# Patient Record
Sex: MALE | Race: WHITE | NOT HISPANIC OR LATINO | URBAN - METROPOLITAN AREA
[De-identification: names, ages, dates, MRNs, and addresses within clinical notes are randomized per-mention and may not be internally consistent; named-entity substitution may affect disease eponyms.]

---

## 2020-10-28 ENCOUNTER — OFFICE VISIT (OUTPATIENT)
Dept: URBAN - METROPOLITAN AREA TELEHEALTH 2 | Facility: TELEHEALTH | Age: 55
End: 2020-10-28
Payer: COMMERCIAL

## 2020-10-28 DIAGNOSIS — R19.7 DIARRHEA: ICD-10-CM

## 2020-10-28 DIAGNOSIS — K51.80 CHRONIC PANCOLONIC ULCERATIVE COLITIS: ICD-10-CM

## 2020-10-28 DIAGNOSIS — K52.89 (LYMPHOCYTIC) MICROSCOPIC COLITIS: ICD-10-CM

## 2020-10-28 DIAGNOSIS — K51.90 ULCERATIVE COLITIS: ICD-10-CM

## 2020-10-28 DIAGNOSIS — K52.9 IBD (INFLAMMATORY BOWEL DISEASE): ICD-10-CM

## 2020-10-28 PROCEDURE — G8427 DOCREV CUR MEDS BY ELIG CLIN: HCPCS | Performed by: INTERNAL MEDICINE

## 2020-10-28 PROCEDURE — 1036F TOBACCO NON-USER: CPT | Performed by: INTERNAL MEDICINE

## 2020-10-28 PROCEDURE — G9903 PT SCRN TBCO ID AS NON USER: HCPCS | Performed by: INTERNAL MEDICINE

## 2020-10-28 PROCEDURE — 3017F COLORECTAL CA SCREEN DOC REV: CPT | Performed by: INTERNAL MEDICINE

## 2020-10-28 PROCEDURE — 99213 OFFICE O/P EST LOW 20 MIN: CPT | Performed by: INTERNAL MEDICINE

## 2020-10-28 PROCEDURE — G8484 FLU IMMUNIZE NO ADMIN: HCPCS | Performed by: INTERNAL MEDICINE

## 2020-10-28 PROCEDURE — G8417 CALC BMI ABV UP PARAM F/U: HCPCS | Performed by: INTERNAL MEDICINE

## 2020-10-28 RX ORDER — PREDNISONE 10 MG/1
1 TABLET TABLET ORAL ONCE A DAY
Qty: 30 | OUTPATIENT
Start: 2020-10-28 | End: 2020-11-26

## 2020-10-28 RX ORDER — MESALAMINE 4 G/60ML
AS DIRECTED SUSPENSION RECTAL
Qty: 30 | Refills: 0 | OUTPATIENT
Start: 2020-10-28 | End: 2020-11-26

## 2020-10-28 RX ORDER — MESALAMINE 1.2 G/1
TAKE 2 TABLETS (2.4 GRAM) BY ORAL ROUTE ONCE DAILY WITH A MEAL TABLET, DELAYED RELEASE ORAL 1
Qty: 180 | Refills: 3 | Status: ACTIVE | COMMUNITY
Start: 2018-05-11 | End: 1900-01-01

## 2020-10-28 RX ORDER — PREDNISONE 10 MG/1
6 PO QD TABLET ORAL
Qty: 180 | Refills: 3 | Status: ACTIVE | COMMUNITY
Start: 2018-03-09 | End: 1900-01-01

## 2020-10-28 NOTE — HPI-TODAY'S VISIT:
the patient is evaluated today by telephone.  I have not seen him in over 2 years.  We last visited in May of 2018.  He has chronic left-sided ulcerative colitis.  We did a prednisone taper at his last visit.  I prescribed mesalamine 2.4 g daily to take for maintenance.  He did not do that.  He has not been on any medication at all after he took mesalamine for about a month 2 and half years ago.  He started to get a flare a few weeks ago with abdominal pain cramping and bloody diarrhea.

## 2020-11-09 ENCOUNTER — TELEPHONE ENCOUNTER (OUTPATIENT)
Dept: URBAN - METROPOLITAN AREA CLINIC 92 | Facility: CLINIC | Age: 55
End: 2020-11-09

## 2020-12-03 ENCOUNTER — OFFICE VISIT (OUTPATIENT)
Dept: URBAN - METROPOLITAN AREA TELEHEALTH 2 | Facility: TELEHEALTH | Age: 55
End: 2020-12-03
Payer: COMMERCIAL

## 2020-12-03 DIAGNOSIS — K61.0 PERIANAL ABSCESS: ICD-10-CM

## 2020-12-03 DIAGNOSIS — K52.89 (LYMPHOCYTIC) MICROSCOPIC COLITIS: ICD-10-CM

## 2020-12-03 DIAGNOSIS — K52.9 IBD (INFLAMMATORY BOWEL DISEASE): ICD-10-CM

## 2020-12-03 DIAGNOSIS — K51.90 ULCERATIVE COLITIS: ICD-10-CM

## 2020-12-03 PROBLEM — 64766004 ULCERATIVE COLITIS: Status: ACTIVE | Noted: 2020-10-28

## 2020-12-03 PROCEDURE — G8417 CALC BMI ABV UP PARAM F/U: HCPCS | Performed by: INTERNAL MEDICINE

## 2020-12-03 PROCEDURE — 1036F TOBACCO NON-USER: CPT | Performed by: INTERNAL MEDICINE

## 2020-12-03 PROCEDURE — G8427 DOCREV CUR MEDS BY ELIG CLIN: HCPCS | Performed by: INTERNAL MEDICINE

## 2020-12-03 PROCEDURE — 99213 OFFICE O/P EST LOW 20 MIN: CPT | Performed by: INTERNAL MEDICINE

## 2020-12-03 PROCEDURE — G9903 PT SCRN TBCO ID AS NON USER: HCPCS | Performed by: INTERNAL MEDICINE

## 2020-12-03 PROCEDURE — G8482 FLU IMMUNIZE ORDER/ADMIN: HCPCS | Performed by: INTERNAL MEDICINE

## 2020-12-03 PROCEDURE — 3017F COLORECTAL CA SCREEN DOC REV: CPT | Performed by: INTERNAL MEDICINE

## 2020-12-03 RX ORDER — SODIUM, POTASSIUM,MAG SULFATES 17.5-3.13G
177 ML SOLUTION, RECONSTITUTED, ORAL ORAL
Qty: 1 KIT | Refills: 0 | OUTPATIENT
Start: 2020-12-03

## 2020-12-03 RX ORDER — BISACODYL 5 MG
TAKE 4 TABLET, DELAYED RELEASE (ENTERIC COATED) ORAL
Qty: 4 | OUTPATIENT
Start: 2020-12-03 | End: 2020-12-04

## 2020-12-03 RX ORDER — PREDNISONE 10 MG/1
6 PO QD TABLET ORAL
Qty: 180 | Refills: 3 | Status: ACTIVE | COMMUNITY
Start: 2018-03-09

## 2020-12-03 RX ORDER — MESALAMINE 1.2 G/1
TAKE 2 TABLETS (2.4 GRAM) BY ORAL ROUTE ONCE DAILY WITH A MEAL TABLET, DELAYED RELEASE ORAL 1
Qty: 180 | Refills: 3 | Status: ACTIVE | COMMUNITY
Start: 2018-05-11

## 2020-12-03 NOTE — HPI-TODAY'S VISIT:
the patient is evaluated today by telemedicine.  He has chronic ulcerative colitis since the 90s.  I did his colonoscopy in April of 2018. He had ulcerative colitis from the mid transverse colon down to the rectum.  He has not been on anything consistently daily to keep his colitis under control.  At his last visit he was seen because he was having a flare.  He was started on prednisone taper and things got better however he developed a perianal abscess from history, I have no records, and had this drained in the OR at   [facility]. bowels are doing better now.  Not having diarrhea or bloody stool.  He was is recently tested positive for COVID and is now on  quarantine

## 2021-01-28 ENCOUNTER — OFFICE VISIT (OUTPATIENT)
Dept: RURAL MEDICAL CENTER 4 | Facility: MEDICAL CENTER | Age: 56
End: 2021-01-28
Payer: COMMERCIAL

## 2021-01-28 DIAGNOSIS — D12.2 ADENOMA OF ASCENDING COLON: ICD-10-CM

## 2021-01-28 DIAGNOSIS — K63.89 BACTERIAL OVERGROWTH SYNDROME: ICD-10-CM

## 2021-01-28 DIAGNOSIS — K51.80 CHRONIC PANCOLONIC ULCERATIVE COLITIS: ICD-10-CM

## 2021-01-28 PROCEDURE — 45380 COLONOSCOPY AND BIOPSY: CPT | Performed by: INTERNAL MEDICINE

## 2021-01-28 RX ORDER — MESALAMINE 1.2 G/1
TAKE 2 TABLETS (2.4 GRAM) BY ORAL ROUTE ONCE DAILY WITH A MEAL TABLET, DELAYED RELEASE ORAL 1
Qty: 180 | Refills: 3 | Status: ACTIVE | COMMUNITY
Start: 2018-05-11

## 2021-01-28 RX ORDER — PREDNISONE 10 MG/1
6 PO QD TABLET ORAL
Qty: 180 | Refills: 3 | Status: ACTIVE | COMMUNITY
Start: 2018-03-09

## 2021-01-28 RX ORDER — SODIUM, POTASSIUM,MAG SULFATES 17.5-3.13G
177 ML SOLUTION, RECONSTITUTED, ORAL ORAL
Qty: 1 KIT | Refills: 0 | Status: ACTIVE | COMMUNITY
Start: 2020-12-03

## 2021-02-02 ENCOUNTER — TELEPHONE ENCOUNTER (OUTPATIENT)
Dept: URBAN - METROPOLITAN AREA CLINIC 105 | Facility: CLINIC | Age: 56
End: 2021-02-02

## 2021-02-02 RX ORDER — MESALAMINE 1.2 G/1
2 TABLETS TABLET, DELAYED RELEASE ORAL ONCE A DAY
Qty: 60 TABLET | Refills: 5 | OUTPATIENT
Start: 2021-02-02 | End: 2021-08-01

## 2021-08-11 ENCOUNTER — OFFICE VISIT (OUTPATIENT)
Dept: RURAL CLINIC 8 | Facility: CLINIC | Age: 56
End: 2021-08-11
Payer: COMMERCIAL

## 2021-08-11 DIAGNOSIS — K51.30 CHRONIC ULCERATIVE RECTOSIGMOIDITIS WITHOUT COMPLICATIONS: ICD-10-CM

## 2021-08-11 DIAGNOSIS — Z86.010 HISTORY OF COLON POLYPS: ICD-10-CM

## 2021-08-11 DIAGNOSIS — K52.9 IBD (INFLAMMATORY BOWEL DISEASE): ICD-10-CM

## 2021-08-11 PROBLEM — 52506002: Status: ACTIVE | Noted: 2021-08-11

## 2021-08-11 PROCEDURE — 99214 OFFICE O/P EST MOD 30 MIN: CPT | Performed by: INTERNAL MEDICINE

## 2021-08-11 RX ORDER — PREDNISONE 10 MG/1
6 PO QD TABLET ORAL
Qty: 180 | Refills: 3 | Status: DISCONTINUED | COMMUNITY
Start: 2018-03-09

## 2021-08-11 RX ORDER — SODIUM, POTASSIUM,MAG SULFATES 17.5-3.13G
177 ML SOLUTION, RECONSTITUTED, ORAL ORAL
Qty: 1 KIT | Refills: 0 | Status: DISCONTINUED | COMMUNITY
Start: 2020-12-03

## 2021-08-11 RX ORDER — MESALAMINE 1.2 G/1
TAKE 2 TABLETS (2.4 GRAM) BY ORAL ROUTE ONCE DAILY WITH A MEAL TABLET, DELAYED RELEASE ORAL 1
Qty: 180 | Refills: 3
Start: 2018-05-11

## 2021-08-11 RX ORDER — MESALAMINE 1.2 G/1
TAKE 2 TABLETS (2.4 GRAM) BY ORAL ROUTE ONCE DAILY WITH A MEAL TABLET, DELAYED RELEASE ORAL 1
Qty: 180 | Refills: 3 | Status: ACTIVE | COMMUNITY
Start: 2018-05-11

## 2021-08-11 NOTE — HPI-TODAY'S VISIT:
I did this patient's colonoscopy in January 2021.  There was an area of continuous inflammation in the left colon consistent with chronic ulcerative colitis.  The patient also did have areas of patchy inflammation in the other areas of the colon.  Biopsies were consistent with inflammatory bowel disease.  There was 1 small tubular adenoma that I removed. - he is doing very well on mesalamine. he is taking 2.4gm daily and a probiotic. he is having normal stool without diarrhea. no blood or mucous. he is having two formed stools per day. no issues. - he was first diagnosed in 1996. A GI doctor in [facility] found it first.

## 2021-10-27 ENCOUNTER — ERX REFILL RESPONSE (OUTPATIENT)
Dept: RURAL CLINIC 2 | Facility: CLINIC | Age: 56
End: 2021-10-27

## 2021-10-27 RX ORDER — MESALAMINE 1.2 G/1
TAKE TWO TABLETS BY MOUTH EVERY DAY TABLET, DELAYED RELEASE ORAL
Qty: 60 TABLET | Refills: 6 | OUTPATIENT

## 2022-07-13 ENCOUNTER — OFFICE VISIT (OUTPATIENT)
Dept: RURAL CLINIC 8 | Facility: CLINIC | Age: 57
End: 2022-07-13
Payer: COMMERCIAL

## 2022-07-13 VITALS
WEIGHT: 255 LBS | SYSTOLIC BLOOD PRESSURE: 149 MMHG | TEMPERATURE: 98.2 F | HEIGHT: 72 IN | BODY MASS INDEX: 34.54 KG/M2 | DIASTOLIC BLOOD PRESSURE: 97 MMHG | HEART RATE: 68 BPM

## 2022-07-13 DIAGNOSIS — K52.9 IBD (INFLAMMATORY BOWEL DISEASE): ICD-10-CM

## 2022-07-13 DIAGNOSIS — Z86.010 HISTORY OF COLON POLYPS: ICD-10-CM

## 2022-07-13 DIAGNOSIS — K51.30 ULCERATIVE RECTOSIGMOIDITIS WITHOUT COMPLICATION: ICD-10-CM

## 2022-07-13 PROBLEM — 428283002: Status: ACTIVE | Noted: 2021-08-11

## 2022-07-13 PROBLEM — 41364008: Status: ACTIVE | Noted: 2022-07-13

## 2022-07-13 PROCEDURE — 99214 OFFICE O/P EST MOD 30 MIN: CPT | Performed by: INTERNAL MEDICINE

## 2022-07-13 RX ORDER — MESALAMINE 1.2 G/1
TAKE TWO TABLETS BY MOUTH EVERY DAY TABLET, DELAYED RELEASE ORAL
Qty: 60 TABLET | Refills: 6 | Status: ACTIVE | COMMUNITY

## 2022-07-13 RX ORDER — MESALAMINE 1.2 G/1
2 TABLETS TABLET, DELAYED RELEASE ORAL ONCE A DAY
Qty: 180 TABLET | Refills: 3

## 2022-07-13 NOTE — HPI-TODAY'S VISIT:
I did this patient's colonoscopy in January 2021.  There was an area of continuous inflammation in the left colon consistent with chronic ulcerative colitis.  The patient also did have areas of patchy inflammation in the other areas of the colon.  Biopsies were consistent with inflammatory bowel disease.  There was 1 small tubular adenoma that I removed. - he is doing very well on mesalamine. he is taking 2.4gm daily and a probiotic. he is having normal stool without diarrhea. no blood or mucous. he is having two formed stools per day. no issues. - he was first diagnosed in 1996. A GI doctor in [facility] found it first.

## 2023-06-17 LAB
A/G RATIO: 1.8
ALBUMIN: 4.2
ALKALINE PHOSPHATASE: 84
ALT (SGPT): 16
AST (SGOT): 19
BILIRUBIN, TOTAL: 0.4
BUN/CREATININE RATIO: 26
BUN: 21
CALCIUM: 9.7
CARBON DIOXIDE, TOTAL: 22
CHLORIDE: 100
CREATININE: 0.8
EGFR: 105
GLOBULIN, TOTAL: 2.4
GLUCOSE: 117
HEMATOCRIT: 40.4
HEMOGLOBIN: 14
MCH: 30.6
MCHC: 34.7
MCV: 88
NRBC: (no result)
PLATELETS: 229
POTASSIUM: 4.4
PROTEIN, TOTAL: 6.6
RBC: 4.58
RDW: 13.9
SODIUM: 140
WBC: 6.2

## 2023-11-09 PROBLEM — 444546002: Status: ACTIVE | Noted: 2023-11-09

## 2023-11-10 ENCOUNTER — DASHBOARD ENCOUNTERS (OUTPATIENT)
Age: 58
End: 2023-11-10

## 2023-11-10 ENCOUNTER — OFFICE VISIT (OUTPATIENT)
Dept: RURAL CLINIC 8 | Facility: CLINIC | Age: 58
End: 2023-11-10
Payer: COMMERCIAL

## 2023-11-10 VITALS
HEIGHT: 72 IN | SYSTOLIC BLOOD PRESSURE: 137 MMHG | DIASTOLIC BLOOD PRESSURE: 87 MMHG | TEMPERATURE: 97.3 F | WEIGHT: 241 LBS | BODY MASS INDEX: 32.64 KG/M2 | HEART RATE: 69 BPM

## 2023-11-10 DIAGNOSIS — K51.818 OTHER ULCERATIVE COLITIS WITH OTHER COMPLICATION: ICD-10-CM

## 2023-11-10 DIAGNOSIS — Z86.010 HISTORY OF COLON POLYPS: ICD-10-CM

## 2023-11-10 PROCEDURE — 99214 OFFICE O/P EST MOD 30 MIN: CPT | Performed by: INTERNAL MEDICINE

## 2023-11-10 RX ORDER — MESALAMINE 1.2 G/1
2 TABLETS TABLET, DELAYED RELEASE ORAL ONCE A DAY
Qty: 180 TABLET | Refills: 3
End: 2024-11-04

## 2023-11-10 RX ORDER — MESALAMINE 1.2 G/1
2 TABLETS TABLET, DELAYED RELEASE ORAL ONCE A DAY
Qty: 180 TABLET | Refills: 3 | COMMUNITY

## 2023-11-10 RX ORDER — BISACODYL 5 MG
TAKE 4 TABLET, DELAYED RELEASE (ENTERIC COATED) ORAL
Qty: 4 | OUTPATIENT
Start: 2023-11-09 | End: 2023-11-10

## 2023-11-10 RX ORDER — SODIUM, POTASSIUM,MAG SULFATES 17.5-3.13G
177 ML SOLUTION, RECONSTITUTED, ORAL ORAL
Qty: 1 KIT | Refills: 0 | OUTPATIENT
Start: 2023-11-09 | End: 2023-11-10

## 2023-11-10 NOTE — HPI-TODAY'S VISIT:
I did this patient's colonoscopy in January 2021.  There was an area of continuous inflammation in the left colon consistent with chronic ulcerative colitis.  The patient also did have areas of patchy inflammation in the other areas of the colon.  Biopsies were consistent with inflammatory bowel disease.  There was 1 small tubular adenoma that I removed. - he is doing very well on mesalamine. he is taking 2.4gm daily and a probiotic. he is having normal stool without diarrhea. no blood or mucous. he is having two formed stools per day. no issues. - he was first diagnosed in 1996. A GI doctor in [facility] found it first. -   11/10/2023: Patient was last seen in July of 2022-over a year ago.  We discussed at his last visit the importance of regular follow-up at 6 month interval to monitor renal function in patients who are taking mesalamine. -' his stools have been doing just fine. no diarrhea or rectal bleeding. he had blood work done just back in July. his SCr was 1.35 normal cbc.

## 2024-02-08 ENCOUNTER — COLON (OUTPATIENT)
Dept: RURAL MEDICAL CENTER 4 | Facility: MEDICAL CENTER | Age: 59
End: 2024-02-08
Payer: COMMERCIAL

## 2024-02-08 DIAGNOSIS — K51.80 CHRONIC PANCOLONIC ULCERATIVE COLITIS: ICD-10-CM

## 2024-02-08 DIAGNOSIS — K63.89 APPENDICITIS EPIPLOICA: ICD-10-CM

## 2024-02-08 PROCEDURE — 45385 COLONOSCOPY W/LESION REMOVAL: CPT | Performed by: INTERNAL MEDICINE

## 2024-02-08 PROCEDURE — 45380 COLONOSCOPY AND BIOPSY: CPT | Performed by: INTERNAL MEDICINE

## 2024-02-08 RX ORDER — MESALAMINE 1.2 G/1
2 TABLETS TABLET, DELAYED RELEASE ORAL ONCE A DAY
Qty: 180 TABLET | Refills: 3 | Status: ACTIVE | COMMUNITY
End: 2024-11-04